# Patient Record
Sex: FEMALE | Race: WHITE
[De-identification: names, ages, dates, MRNs, and addresses within clinical notes are randomized per-mention and may not be internally consistent; named-entity substitution may affect disease eponyms.]

---

## 2020-07-18 ENCOUNTER — HOSPITAL ENCOUNTER (EMERGENCY)
Dept: HOSPITAL 76 - ED | Age: 20
Discharge: HOME | End: 2020-07-18
Payer: COMMERCIAL

## 2020-07-18 VITALS — SYSTOLIC BLOOD PRESSURE: 126 MMHG | DIASTOLIC BLOOD PRESSURE: 78 MMHG

## 2020-07-18 DIAGNOSIS — R51: ICD-10-CM

## 2020-07-18 DIAGNOSIS — N12: Primary | ICD-10-CM

## 2020-07-18 DIAGNOSIS — R42: ICD-10-CM

## 2020-07-18 LAB
ALBUMIN DIAFP-MCNC: 4.4 G/DL (ref 3.2–5.5)
ALBUMIN/GLOB SERPL: 1.3 {RATIO} (ref 1–2.2)
ALP SERPL-CCNC: 46 IU/L (ref 42–121)
ALT SERPL W P-5'-P-CCNC: 17 IU/L (ref 10–60)
ANION GAP SERPL CALCULATED.4IONS-SCNC: 8 MMOL/L (ref 6–13)
AST SERPL W P-5'-P-CCNC: 18 IU/L (ref 10–42)
BASOPHILS NFR BLD AUTO: 0 10^3/UL (ref 0–0.1)
BASOPHILS NFR BLD AUTO: 0.3 %
BILIRUB BLD-MCNC: 0.5 MG/DL (ref 0.2–1)
BUN SERPL-MCNC: 11 MG/DL (ref 6–20)
CALCIUM UR-MCNC: 8.8 MG/DL (ref 8.5–10.3)
CHLORIDE SERPL-SCNC: 105 MMOL/L (ref 101–111)
CLARITY UR REFRACT.AUTO: (no result)
CO2 SERPL-SCNC: 22 MMOL/L (ref 21–32)
CREAT SERPLBLD-SCNC: 0.8 MG/DL (ref 0.4–1)
EOSINOPHIL # BLD AUTO: 0 10^3/UL (ref 0–0.7)
EOSINOPHIL NFR BLD AUTO: 0.1 %
ERYTHROCYTE [DISTWIDTH] IN BLOOD BY AUTOMATED COUNT: 13 % (ref 12–15)
GLOBULIN SER-MCNC: 3.4 G/DL (ref 2.1–4.2)
GLUCOSE SERPL-MCNC: 89 MG/DL (ref 70–100)
GLUCOSE UR QL STRIP.AUTO: NEGATIVE MG/DL
HCG UR QL: NEGATIVE
HGB UR QL STRIP: 13.7 G/DL (ref 12–16)
KETONES UR QL STRIP.AUTO: (no result) MG/DL
LIPASE SERPL-CCNC: 33 U/L (ref 22–51)
LYMPHOCYTES # SPEC AUTO: 1.3 10^3/UL (ref 1.5–3.5)
LYMPHOCYTES NFR BLD AUTO: 11.3 %
MCH RBC QN AUTO: 31.6 PG (ref 27–31)
MCHC RBC AUTO-ENTMCNC: 35 G/DL (ref 32–36)
MCV RBC AUTO: 90.3 FL (ref 81–99)
MONOCYTES # BLD AUTO: 0.9 10^3/UL (ref 0–1)
MONOCYTES NFR BLD AUTO: 7.9 %
NEUTROPHILS # BLD AUTO: 8.8 10^3/UL (ref 1.5–6.6)
NEUTROPHILS # SNV AUTO: 11 X10^3/UL (ref 4.8–10.8)
NEUTROPHILS NFR BLD AUTO: 80 %
NITRITE UR QL STRIP.AUTO: POSITIVE
PDW BLD AUTO: 9.6 FL (ref 7.9–10.8)
PH UR STRIP.AUTO: 5.5 PH (ref 5–7.5)
PLATELET # BLD: 188 10^3/UL (ref 130–450)
PROT SPEC-MCNC: 7.8 G/DL (ref 6.7–8.2)
PROT UR STRIP.AUTO-MCNC: 30 MG/DL
RBC # UR STRIP.AUTO: (no result) /UL
RBC MAR: 4.33 10^6/UL (ref 4.2–5.4)
SODIUM SERPLBLD-SCNC: 135 MMOL/L (ref 135–145)
SP GR UR STRIP.AUTO: 1.02 (ref 1–1.03)
SP GR UR STRIP.AUTO: 1.02 (ref 1–1.03)
SQUAMOUS URNS QL MICRO: (no result)
UROBILINOGEN UR QL STRIP.AUTO: 2 E.U./DL
UROBILINOGEN UR STRIP.AUTO-MCNC: NEGATIVE MG/DL

## 2020-07-18 PROCEDURE — 83605 ASSAY OF LACTIC ACID: CPT

## 2020-07-18 PROCEDURE — 81025 URINE PREGNANCY TEST: CPT

## 2020-07-18 PROCEDURE — 80053 COMPREHEN METABOLIC PANEL: CPT

## 2020-07-18 PROCEDURE — 87086 URINE CULTURE/COLONY COUNT: CPT

## 2020-07-18 PROCEDURE — 96374 THER/PROPH/DIAG INJ IV PUSH: CPT

## 2020-07-18 PROCEDURE — 83690 ASSAY OF LIPASE: CPT

## 2020-07-18 PROCEDURE — 36415 COLL VENOUS BLD VENIPUNCTURE: CPT

## 2020-07-18 PROCEDURE — 81003 URINALYSIS AUTO W/O SCOPE: CPT

## 2020-07-18 PROCEDURE — 96375 TX/PRO/DX INJ NEW DRUG ADDON: CPT

## 2020-07-18 PROCEDURE — 99284 EMERGENCY DEPT VISIT MOD MDM: CPT

## 2020-07-18 PROCEDURE — 85025 COMPLETE CBC W/AUTO DIFF WBC: CPT

## 2020-07-18 PROCEDURE — 81001 URINALYSIS AUTO W/SCOPE: CPT

## 2020-07-18 NOTE — ED PHYSICIAN DOCUMENTATION
PD HPI FEMALE 





- Stated complaint


Stated Complaint: FEMALE  / FEVER





- Chief complaint


Chief Complaint: UTI





- History obtained from


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 

[employer].  She was diagnosed with a UTI yesterday at [facility] and started on 

Macrobid.  Today she feels dizzy and has a headache and fever to 103.  No URI 

symptoms.)





Review of Systems


Constitutional: reports: Fever, Chills


Nose: denies: Rhinorrhea / runny nose, Congestion


Throat: denies: Sore throat


Cardiac: denies: Chest pain / pressure, Palpitations


Respiratory: denies: Dyspnea, Cough





PD PAST MEDICAL HISTORY





- Present Medications


Home Medications: 


                                Ambulatory Orders











 Medication  Instructions  Recorded  Confirmed


 


Ciprofloxacin HCl [Cipro] 500 mg PO BID #20 tablet 07/18/20 














- Allergies


Allergies/Adverse Reactions: 


                                    Allergies











Allergy/AdvReac Type Severity Reaction Status Date / Time


 


Penicillins Allergy  Rash Verified 07/18/20 20:16














PD ED PE NORMAL





- Vitals


Vital signs reviewed: Yes





- General


General: Alert and oriented X 3, No acute distress





- Neck


Neck: Supple, no meningeal sign





- Abdomen


Abdomen: Soft, Non tender





- Back


Back: No CVA TTP, No spinal TTP





- Derm


Derm: No rash





- Neuro


Neuro: Alert and oriented X 3, Normal speech





Results





- Vitals


Vitals: 


                               Vital Signs - 24 hr











  07/18/20 07/18/20





  20:16 21:45


 


Temperature 37.3 C 37.4 C


 


Heart Rate 108 H 84


 


Respiratory 16 20





Rate  


 


Blood Pressure 125/71 126/78


 


O2 Saturation 97 97








                                     Oxygen











O2 Source                      Room air

















- Labs


Labs: 


                                Laboratory Tests











  07/18/20 07/18/20 07/18/20





  20:40 20:40 20:40


 


WBC  11.0 H  


 


RBC  4.33  


 


Hgb  13.7  


 


Hct  39.1  


 


MCV  90.3  


 


MCH  31.6 H  


 


MCHC  35.0  


 


RDW  13.0  


 


Plt Count  188  


 


MPV  9.6  


 


Neut # (Auto)  8.8 H  


 


Lymph # (Auto)  1.3 L  


 


Mono # (Auto)  0.9  


 


Eos # (Auto)  0.0  


 


Baso # (Auto)  0.0  


 


Absolute Nucleated RBC  0.00  


 


Nucleated RBC %  0.0  


 


Sodium   135 


 


Potassium   3.5 


 


Chloride   105 


 


Carbon Dioxide   22 


 


Anion Gap   8.0 


 


BUN   11 


 


Creatinine   0.8 


 


Estimated GFR (MDRD)   90 


 


Glucose   89 


 


Lactic Acid    1.3


 


Calcium   8.8 


 


Total Bilirubin   0.5 


 


AST   18 


 


ALT   17 


 


Alkaline Phosphatase   46 


 


Total Protein   7.8 


 


Albumin   4.4 


 


Globulin   3.4 


 


Albumin/Globulin Ratio   1.3 


 


Lipase   33 


 


Urine Color   


 


Urine Clarity   


 


Urine pH   


 


Ur Specific Gravity   


 


Urine Protein   


 


Urine Glucose (UA)   


 


Urine Ketones   


 


Urine Occult Blood   


 


Urine Nitrite   


 


Urine Bilirubin   


 


Urine Urobilinogen   


 


Ur Leukocyte Esterase   


 


Urine RBC   


 


Urine WBC   


 


Ur Squamous Epith Cells   


 


Urine Bacteria   


 


Ur Microscopic Review   


 


Urine Culture Comments   


 


Urine HCG, Qual   














  07/18/20 07/18/20





  20:42 20:42


 


WBC  


 


RBC  


 


Hgb  


 


Hct  


 


MCV  


 


MCH  


 


MCHC  


 


RDW  


 


Plt Count  


 


MPV  


 


Neut # (Auto)  


 


Lymph # (Auto)  


 


Mono # (Auto)  


 


Eos # (Auto)  


 


Baso # (Auto)  


 


Absolute Nucleated RBC  


 


Nucleated RBC %  


 


Sodium  


 


Potassium  


 


Chloride  


 


Carbon Dioxide  


 


Anion Gap  


 


BUN  


 


Creatinine  


 


Estimated GFR (MDRD)  


 


Glucose  


 


Lactic Acid  


 


Calcium  


 


Total Bilirubin  


 


AST  


 


ALT  


 


Alkaline Phosphatase  


 


Total Protein  


 


Albumin  


 


Globulin  


 


Albumin/Globulin Ratio  


 


Lipase  


 


Urine Color  YELLOW 


 


Urine Clarity  HAZY 


 


Urine pH  5.5 


 


Ur Specific Gravity  1.025  1.025


 


Urine Protein  30 H 


 


Urine Glucose (UA)  NEGATIVE 


 


Urine Ketones  TRACE 


 


Urine Occult Blood  SMALL H 


 


Urine Nitrite  POSITIVE H 


 


Urine Bilirubin  NEGATIVE 


 


Urine Urobilinogen  2 H 


 


Ur Leukocyte Esterase  TRACE H 


 


Urine RBC  11-25 H 


 


Urine WBC  4-5 


 


Ur Squamous Epith Cells  FEW Squamous 


 


Urine Bacteria  Few 


 


Ur Microscopic Review  INDICATED 


 


Urine Culture Comments  INDICATED 


 


Urine HCG, Qual   NEGATIVE














PD MEDICAL DECISION MAKING





- ED course


ED course: 





21yo F with pyelonephritis. On nitrofuranoin but lack of nephron covg so given 

rocephin/cipro.





Departure





- Departure


Disposition: 01 Home, Self Care


Clinical Impression: 


 Pyelonephritis





Condition: Good


Record reviewed to determine appropriate education?: Yes


Instructions:  Pyelonephritis Dc


Prescriptions: 


Ciprofloxacin HCl [Cipro] 500 mg PO BID #20 tablet


Comments: 


We will culture your urine, the results should be done in 48-72 hours.  If an 

antibiotic change is necessary we will call you.  Return if worse in the meantim

e, especially if you develop increasing flank pain, or cannot keep down the 

medication.


Discharge Date/Time: 07/18/20 22:01

## 2021-03-28 ENCOUNTER — HOSPITAL ENCOUNTER (EMERGENCY)
Dept: HOSPITAL 76 - ED | Age: 21
Discharge: HOME | End: 2021-03-28
Payer: COMMERCIAL

## 2021-03-28 VITALS — SYSTOLIC BLOOD PRESSURE: 116 MMHG | DIASTOLIC BLOOD PRESSURE: 61 MMHG

## 2021-03-28 DIAGNOSIS — A60.04: Primary | ICD-10-CM

## 2021-03-28 PROCEDURE — 87529 HSV DNA AMP PROBE: CPT

## 2021-03-28 PROCEDURE — 99283 EMERGENCY DEPT VISIT LOW MDM: CPT

## 2021-03-28 NOTE — ED PHYSICIAN DOCUMENTATION
PD HPI FEMALE 





- Stated complaint


Stated Complaint: ABD PX





- Chief complaint


Chief Complaint: Abd Pain





- History obtained from


History obtained from: Patient





- Additional information


Additional information: 





21-year-old woman presents stating that she was exposed to herpes simplex virus 

2 from her significant other.  She has a lesion on her left labia that she would

like tested.  No other complaints at this time.





Review of Systems


Constitutional: denies: Fever


: denies: Dysuria


Skin: reports: Rash





PD PAST MEDICAL HISTORY





- Past Medical History


Past Medical History: No





- Past Surgical History


Past Surgical History: No





- Present Medications


Home Medications: 


                                Ambulatory Orders











 Medication  Instructions  Recorded  Confirmed


 


Acyclovir [Zovirax] 400 mg PO TID #30 tab 03/28/21 














- Allergies


Allergies/Adverse Reactions: 


                                    Allergies











Allergy/AdvReac Type Severity Reaction Status Date / Time


 


Penicillins Allergy  Hives Verified 03/28/21 11:20














- Social History


Does the pt smoke?: No


Smoking Status: Never smoker


Does the pt have substance abuse?: No





- Immunizations


Immunizations are current?: Yes





- POLST


Patient has POLST: No





PD ED PE NORMAL





- Vitals


Vital signs reviewed: Yes





- General


General: Alert and oriented X 3, No acute distress, Well developed/nourished





- HEENT


HEENT: Atraumatic, PERRL, EOMI





- Female 


Female : Other (Normal external female genitalia with exception of vesicular 

rash to labia majora.)





- Derm


Derm: Normal color, Warm and dry, Other (Vesicular rash to left labia majora. RN

 chaperone)





- Neuro


Neuro: Alert and oriented X 3





- Psych


Psych: Normal mood, Normal affect





Results





- Vitals


Vitals: 


                               Vital Signs - 24 hr











  03/28/21





  11:17


 


Temperature 36.8 C


 


Heart Rate 76


 


Respiratory 18





Rate 


 


Blood Pressure 116/61


 


O2 Saturation 99








                                     Oxygen











O2 Source                      Room air

















PD MEDICAL DECISION MAKING





- ED course


ED course: 





21-year-old woman presents with herpes simplex virus.  First time exposure.  

Acyclovir given.  Strict return precautions given.  Sexual education given.  Fol

low-up with your OB/GYN.





Departure





- Departure


Disposition: 01 Home, Self Care


Clinical Impression: 


 HSV-2 (herpes simplex virus 2) infection





Condition: Good


Instructions:  Herpes Care Sores, Herpes Tx Meds


Prescriptions: 


Acyclovir [Zovirax] 400 mg PO TID #30 tab


Comments: 


You are seen in the emergency department for herpes first-time outbreak. Please 

make sure that you avoid sexual activity your where condoms during active herpes

 outbreaks. Follow-up with your OB/GYN this week. Take your antiviral medication

 as prescribed.


Discharge Date/Time: 03/28/21 12:37

## 2021-04-01 LAB
HSV 1 DNA: NOT DETECTED
HSV 2 DNA: DETECTED
SOURCE: (no result)

## 2022-10-24 ENCOUNTER — HOSPITAL ENCOUNTER (OUTPATIENT)
Dept: HOSPITAL 76 - WFO | Age: 22
Discharge: HOME | End: 2022-10-24
Attending: OBSTETRICS & GYNECOLOGY
Payer: COMMERCIAL

## 2022-10-24 VITALS — SYSTOLIC BLOOD PRESSURE: 116 MMHG | DIASTOLIC BLOOD PRESSURE: 60 MMHG

## 2022-10-24 DIAGNOSIS — Z3A.24: ICD-10-CM

## 2022-10-24 DIAGNOSIS — O36.8120: ICD-10-CM

## 2022-10-24 DIAGNOSIS — R10.11: ICD-10-CM

## 2022-10-24 DIAGNOSIS — O99.891: Primary | ICD-10-CM

## 2022-10-24 DIAGNOSIS — Z28.39: ICD-10-CM

## 2022-10-24 DIAGNOSIS — R10.12: ICD-10-CM

## 2022-10-24 PROCEDURE — 99213 OFFICE O/P EST LOW 20 MIN: CPT

## 2022-10-24 PROCEDURE — 59025 FETAL NON-STRESS TEST: CPT
